# Patient Record
Sex: FEMALE | Race: OTHER | HISPANIC OR LATINO | URBAN - METROPOLITAN AREA
[De-identification: names, ages, dates, MRNs, and addresses within clinical notes are randomized per-mention and may not be internally consistent; named-entity substitution may affect disease eponyms.]

---

## 2021-07-23 ENCOUNTER — EMERGENCY (EMERGENCY)
Facility: HOSPITAL | Age: 46
LOS: 1 days | Discharge: HOME | End: 2021-07-23
Attending: EMERGENCY MEDICINE
Payer: MEDICAID

## 2021-07-23 VITALS
HEIGHT: 60 IN | SYSTOLIC BLOOD PRESSURE: 150 MMHG | HEART RATE: 83 BPM | TEMPERATURE: 100 F | RESPIRATION RATE: 16 BRPM | WEIGHT: 194.89 LBS | DIASTOLIC BLOOD PRESSURE: 83 MMHG | OXYGEN SATURATION: 97 %

## 2021-07-23 LAB
A1C WITH ESTIMATED AVERAGE GLUCOSE RESULT: 5.5 % — SIGNIFICANT CHANGE UP (ref 4–5.6)
ALBUMIN SERPL ELPH-MCNC: 4.2 G/DL — SIGNIFICANT CHANGE UP (ref 3.3–5.2)
ALP SERPL-CCNC: 92 U/L — SIGNIFICANT CHANGE UP (ref 40–120)
ALT FLD-CCNC: 20 U/L — SIGNIFICANT CHANGE UP
ANION GAP SERPL CALC-SCNC: 11 MMOL/L — SIGNIFICANT CHANGE UP (ref 5–17)
APPEARANCE UR: CLEAR — SIGNIFICANT CHANGE UP
AST SERPL-CCNC: 20 U/L — SIGNIFICANT CHANGE UP
BACTERIA # UR AUTO: NEGATIVE — SIGNIFICANT CHANGE UP
BASOPHILS # BLD AUTO: 0.04 K/UL — SIGNIFICANT CHANGE UP (ref 0–0.2)
BASOPHILS NFR BLD AUTO: 0.5 % — SIGNIFICANT CHANGE UP (ref 0–2)
BILIRUB SERPL-MCNC: 0.2 MG/DL — LOW (ref 0.4–2)
BILIRUB UR-MCNC: NEGATIVE — SIGNIFICANT CHANGE UP
BUN SERPL-MCNC: 9.1 MG/DL — SIGNIFICANT CHANGE UP (ref 8–20)
CALCIUM SERPL-MCNC: 9.6 MG/DL — SIGNIFICANT CHANGE UP (ref 8.6–10.2)
CHLORIDE SERPL-SCNC: 104 MMOL/L — SIGNIFICANT CHANGE UP (ref 98–107)
CO2 SERPL-SCNC: 25 MMOL/L — SIGNIFICANT CHANGE UP (ref 22–29)
COLOR SPEC: YELLOW — SIGNIFICANT CHANGE UP
CREAT SERPL-MCNC: 0.64 MG/DL — SIGNIFICANT CHANGE UP (ref 0.5–1.3)
DIFF PNL FLD: ABNORMAL
EOSINOPHIL # BLD AUTO: 0.1 K/UL — SIGNIFICANT CHANGE UP (ref 0–0.5)
EOSINOPHIL NFR BLD AUTO: 1.2 % — SIGNIFICANT CHANGE UP (ref 0–6)
EPI CELLS # UR: SIGNIFICANT CHANGE UP
ESTIMATED AVERAGE GLUCOSE: 111 MG/DL — SIGNIFICANT CHANGE UP (ref 68–114)
GLUCOSE SERPL-MCNC: 100 MG/DL — HIGH (ref 70–99)
GLUCOSE UR QL: NEGATIVE MG/DL — SIGNIFICANT CHANGE UP
HCT VFR BLD CALC: 41.1 % — SIGNIFICANT CHANGE UP (ref 34.5–45)
HGB BLD-MCNC: 13 G/DL — SIGNIFICANT CHANGE UP (ref 11.5–15.5)
IMM GRANULOCYTES NFR BLD AUTO: 0.4 % — SIGNIFICANT CHANGE UP (ref 0–1.5)
KETONES UR-MCNC: NEGATIVE — SIGNIFICANT CHANGE UP
LEUKOCYTE ESTERASE UR-ACNC: ABNORMAL
LYMPHOCYTES # BLD AUTO: 2.6 K/UL — SIGNIFICANT CHANGE UP (ref 1–3.3)
LYMPHOCYTES # BLD AUTO: 31.7 % — SIGNIFICANT CHANGE UP (ref 13–44)
MCHC RBC-ENTMCNC: 27.7 PG — SIGNIFICANT CHANGE UP (ref 27–34)
MCHC RBC-ENTMCNC: 31.6 GM/DL — LOW (ref 32–36)
MCV RBC AUTO: 87.6 FL — SIGNIFICANT CHANGE UP (ref 80–100)
MONOCYTES # BLD AUTO: 0.59 K/UL — SIGNIFICANT CHANGE UP (ref 0–0.9)
MONOCYTES NFR BLD AUTO: 7.2 % — SIGNIFICANT CHANGE UP (ref 2–14)
NEUTROPHILS # BLD AUTO: 4.85 K/UL — SIGNIFICANT CHANGE UP (ref 1.8–7.4)
NEUTROPHILS NFR BLD AUTO: 59 % — SIGNIFICANT CHANGE UP (ref 43–77)
NITRITE UR-MCNC: NEGATIVE — SIGNIFICANT CHANGE UP
PH UR: 7 — SIGNIFICANT CHANGE UP (ref 5–8)
PLATELET # BLD AUTO: 343 K/UL — SIGNIFICANT CHANGE UP (ref 150–400)
POTASSIUM SERPL-MCNC: 4.2 MMOL/L — SIGNIFICANT CHANGE UP (ref 3.5–5.3)
POTASSIUM SERPL-SCNC: 4.2 MMOL/L — SIGNIFICANT CHANGE UP (ref 3.5–5.3)
PROT SERPL-MCNC: 7.6 G/DL — SIGNIFICANT CHANGE UP (ref 6.6–8.7)
PROT UR-MCNC: NEGATIVE MG/DL — SIGNIFICANT CHANGE UP
RBC # BLD: 4.69 M/UL — SIGNIFICANT CHANGE UP (ref 3.8–5.2)
RBC # FLD: 14.1 % — SIGNIFICANT CHANGE UP (ref 10.3–14.5)
RBC CASTS # UR COMP ASSIST: SIGNIFICANT CHANGE UP /HPF (ref 0–4)
SODIUM SERPL-SCNC: 140 MMOL/L — SIGNIFICANT CHANGE UP (ref 135–145)
SP GR SPEC: 1.01 — SIGNIFICANT CHANGE UP (ref 1.01–1.02)
UROBILINOGEN FLD QL: NEGATIVE MG/DL — SIGNIFICANT CHANGE UP
WBC # BLD: 8.21 K/UL — SIGNIFICANT CHANGE UP (ref 3.8–10.5)
WBC # FLD AUTO: 8.21 K/UL — SIGNIFICANT CHANGE UP (ref 3.8–10.5)
WBC UR QL: SIGNIFICANT CHANGE UP

## 2021-07-23 NOTE — ED STATDOCS - OBJECTIVE STATEMENT
46 y/o female with a PMHx of HTN, presents to the ED c/o burning with urination, urinary frequency, associated with pelvis and back pain. Repots nausea and chills. Pt reports hx of symptoms that lasted for an hour, but denies hx of UTI. Denies pregnancy. Pt states she was seen by her PCP 4 days ago went to PCP and the urine test was negative. Reports more vaginal discharge [id number]. Denies vaginal bleeding, diarrhea, constipation, fever, vomiting. NKDA. 46 y/o female with a PMHx of HTN, presents to the ED c/o burning with urination, urinary frequency, associated with pelvis and back pain. Reports nausea and chills. Pt reports hx of symptoms that lasted for an hour, but denies hx of UTI. Denies pregnancy. Pt states she was seen by her PCP 4 days ago and the urine test was negative. Reports more vaginal discharge [id number]. Denies vaginal bleeding, diarrhea, constipation, fever, vomiting. NKDA.

## 2021-07-23 NOTE — ED STATDOCS - NSFOLLOWUPINSTRUCTIONS_ED_ALL_ED_FT
- your work up here is normal  - see your GYN next week for follow up, bring all results with you to that appointment

## 2021-07-23 NOTE — ED STATDOCS - PROGRESS NOTE DETAILS
NP NOTE:  Charting and results reviewed.  Labs unremarkable, pelvic exam unremarkable, abdomin soft NTTP on repeat exam, will need to f/u GYN, vaginal cx pending

## 2021-07-23 NOTE — ED STATDOCS - CLINICAL SUMMARY MEDICAL DECISION MAKING FREE TEXT BOX
Pt presenting with urinary complaints, with recent negative UA. Will repeat UA, send vaginal cultures, obtain labs, reassess. Benign abdominal exam.

## 2021-07-23 NOTE — ED STATDOCS - PATIENT PORTAL LINK FT
You can access the FollowMyHealth Patient Portal offered by [facility] by registering at the following website: [email or web address]. By joining [employer]’s FollowMyHealth portal, you will also be able to view your health information using other applications (apps) compatible with our system.

## 2021-07-24 LAB
C TRACH RRNA SPEC QL NAA+PROBE: SIGNIFICANT CHANGE UP
CULTURE RESULTS: SIGNIFICANT CHANGE UP
N GONORRHOEA RRNA SPEC QL NAA+PROBE: SIGNIFICANT CHANGE UP
SPECIMEN SOURCE: SIGNIFICANT CHANGE UP
SPECIMEN SOURCE: SIGNIFICANT CHANGE UP

## 2021-07-25 LAB
CULTURE RESULTS: SIGNIFICANT CHANGE UP
SPECIMEN SOURCE: SIGNIFICANT CHANGE UP

## 2021-09-24 NOTE — ED ADULT TRIAGE NOTE - AS HEIGHT TYPE
DM c min   OD -Stressed the importance of keeping blood sugars under control blood pressure under control and weight normalization and regular visits with PCP. -Explained the possible effects of poorly controlled diabetes and the damage that diabetes can cause to ocular health. -Patient to check HgbA1C.-Pt instructed to contact our office with any vision changes. PCIOL OUMonitor for PCO Dermatochalasis UL OUBleph sx eval PRNBleph VF PRN; Dr's Notes: DFE 9/24/2021 stated

## 2021-11-04 ENCOUNTER — EMERGENCY (EMERGENCY)
Facility: HOSPITAL | Age: 46
LOS: 1 days | Discharge: HOME | End: 2021-11-04
Attending: EMERGENCY MEDICINE
Payer: MEDICAID

## 2021-11-04 VITALS
WEIGHT: 179.9 LBS | HEART RATE: 92 BPM | OXYGEN SATURATION: 98 % | HEIGHT: 60 IN | SYSTOLIC BLOOD PRESSURE: 153 MMHG | TEMPERATURE: 99 F | DIASTOLIC BLOOD PRESSURE: 98 MMHG | RESPIRATION RATE: 20 BRPM

## 2021-11-04 PROBLEM — Z78.9 OTHER SPECIFIED HEALTH STATUS: Chronic | Status: ACTIVE | Noted: 2021-07-28

## 2021-11-04 LAB
ALBUMIN SERPL ELPH-MCNC: 4.3 G/DL — SIGNIFICANT CHANGE UP (ref 3.3–5.2)
ALP SERPL-CCNC: 90 U/L — SIGNIFICANT CHANGE UP (ref 40–120)
ALT FLD-CCNC: 20 U/L — SIGNIFICANT CHANGE UP
ANION GAP SERPL CALC-SCNC: 13 MMOL/L — SIGNIFICANT CHANGE UP (ref 5–17)
APPEARANCE UR: ABNORMAL
AST SERPL-CCNC: 21 U/L — SIGNIFICANT CHANGE UP
BACTERIA # UR AUTO: ABNORMAL
BASOPHILS # BLD AUTO: 0.05 K/UL — SIGNIFICANT CHANGE UP (ref 0–0.2)
BASOPHILS NFR BLD AUTO: 0.5 % — SIGNIFICANT CHANGE UP (ref 0–2)
BILIRUB SERPL-MCNC: 0.4 MG/DL — SIGNIFICANT CHANGE UP (ref 0.4–2)
BILIRUB UR-MCNC: NEGATIVE — SIGNIFICANT CHANGE UP
BUN SERPL-MCNC: 10.1 MG/DL — SIGNIFICANT CHANGE UP (ref 8–20)
CALCIUM SERPL-MCNC: 9.5 MG/DL — SIGNIFICANT CHANGE UP (ref 8.6–10.2)
CHLORIDE SERPL-SCNC: 103 MMOL/L — SIGNIFICANT CHANGE UP (ref 98–107)
CO2 SERPL-SCNC: 28 MMOL/L — SIGNIFICANT CHANGE UP (ref 22–29)
COLOR SPEC: YELLOW — SIGNIFICANT CHANGE UP
CREAT SERPL-MCNC: 0.68 MG/DL — SIGNIFICANT CHANGE UP (ref 0.5–1.3)
DIFF PNL FLD: ABNORMAL
EOSINOPHIL # BLD AUTO: 0.34 K/UL — SIGNIFICANT CHANGE UP (ref 0–0.5)
EOSINOPHIL NFR BLD AUTO: 3.7 % — SIGNIFICANT CHANGE UP (ref 0–6)
EPI CELLS # UR: SIGNIFICANT CHANGE UP
GLUCOSE SERPL-MCNC: 95 MG/DL — SIGNIFICANT CHANGE UP (ref 70–99)
GLUCOSE UR QL: NEGATIVE MG/DL — SIGNIFICANT CHANGE UP
HCG SERPL-ACNC: <4 MIU/ML — SIGNIFICANT CHANGE UP
HCT VFR BLD CALC: 43.6 % — SIGNIFICANT CHANGE UP (ref 34.5–45)
HGB BLD-MCNC: 14.3 G/DL — SIGNIFICANT CHANGE UP (ref 11.5–15.5)
HIV 1 & 2 AB SERPL IA.RAPID: SIGNIFICANT CHANGE UP
IMM GRANULOCYTES NFR BLD AUTO: 0.4 % — SIGNIFICANT CHANGE UP (ref 0–1.5)
KETONES UR-MCNC: ABNORMAL
LEUKOCYTE ESTERASE UR-ACNC: NEGATIVE — SIGNIFICANT CHANGE UP
LYMPHOCYTES # BLD AUTO: 2.91 K/UL — SIGNIFICANT CHANGE UP (ref 1–3.3)
LYMPHOCYTES # BLD AUTO: 31.5 % — SIGNIFICANT CHANGE UP (ref 13–44)
MCHC RBC-ENTMCNC: 28.1 PG — SIGNIFICANT CHANGE UP (ref 27–34)
MCHC RBC-ENTMCNC: 32.8 GM/DL — SIGNIFICANT CHANGE UP (ref 32–36)
MCV RBC AUTO: 85.7 FL — SIGNIFICANT CHANGE UP (ref 80–100)
MONOCYTES # BLD AUTO: 0.69 K/UL — SIGNIFICANT CHANGE UP (ref 0–0.9)
MONOCYTES NFR BLD AUTO: 7.5 % — SIGNIFICANT CHANGE UP (ref 2–14)
NEUTROPHILS # BLD AUTO: 5.2 K/UL — SIGNIFICANT CHANGE UP (ref 1.8–7.4)
NEUTROPHILS NFR BLD AUTO: 56.4 % — SIGNIFICANT CHANGE UP (ref 43–77)
NITRITE UR-MCNC: NEGATIVE — SIGNIFICANT CHANGE UP
PH UR: 5 — SIGNIFICANT CHANGE UP (ref 5–8)
PLATELET # BLD AUTO: 325 K/UL — SIGNIFICANT CHANGE UP (ref 150–400)
POTASSIUM SERPL-MCNC: 4.2 MMOL/L — SIGNIFICANT CHANGE UP (ref 3.5–5.3)
POTASSIUM SERPL-SCNC: 4.2 MMOL/L — SIGNIFICANT CHANGE UP (ref 3.5–5.3)
PROT SERPL-MCNC: 7.7 G/DL — SIGNIFICANT CHANGE UP (ref 6.6–8.7)
PROT UR-MCNC: 15 MG/DL
RBC # BLD: 5.09 M/UL — SIGNIFICANT CHANGE UP (ref 3.8–5.2)
RBC # FLD: 14.1 % — SIGNIFICANT CHANGE UP (ref 10.3–14.5)
RBC CASTS # UR COMP ASSIST: SIGNIFICANT CHANGE UP /HPF (ref 0–4)
SODIUM SERPL-SCNC: 144 MMOL/L — SIGNIFICANT CHANGE UP (ref 135–145)
SP GR SPEC: 1.01 — SIGNIFICANT CHANGE UP (ref 1.01–1.02)
UROBILINOGEN FLD QL: NEGATIVE MG/DL — SIGNIFICANT CHANGE UP
WBC # BLD: 9.23 K/UL — SIGNIFICANT CHANGE UP (ref 3.8–10.5)
WBC # FLD AUTO: 9.23 K/UL — SIGNIFICANT CHANGE UP (ref 3.8–10.5)
WBC UR QL: NEGATIVE — SIGNIFICANT CHANGE UP

## 2021-11-04 RX ORDER — CEFTRIAXONE 500 MG/1
500 INJECTION, POWDER, FOR SOLUTION INTRAMUSCULAR; INTRAVENOUS ONCE
Refills: 0 | Status: COMPLETED | OUTPATIENT
Start: 2021-11-04 | End: 2021-11-04

## 2021-11-04 RX ADMIN — Medication 100 MILLIGRAM(S): at 12:55

## 2021-11-04 RX ADMIN — CEFTRIAXONE 500 MILLIGRAM(S): 500 INJECTION, POWDER, FOR SOLUTION INTRAMUSCULAR; INTRAVENOUS at 12:55

## 2021-11-04 NOTE — ED PROVIDER NOTE - PROGRESS NOTE DETAILS
Pelvic exam performed by RENÉE Weir with chaperone PA student Lisa Rodriges. No rashes or masses on vulva. internal exam- scant amount of creamy white discharge. no masses or rashes on cervix. Cervical os closed. no blood present. on bimanual exam CMT elicited. spoke with pt regarding transvaginal US and pt now agreeable. Will order and await results. Transvaginal US negative. Will dc with doxy and return precautions.

## 2021-11-04 NOTE — ED PROVIDER NOTE - ATTENDING CONTRIBUTION TO CARE
I, Isamar Ayala, performed the initial face to face bedside interview with this patient regarding history of present illness, review of symptoms and relevant past medical, social and family history.  I completed an independent physical examination.  I was the initial provider who evaluated this patient. I have signed out the follow up of any pending tests (i.e. labs, radiological studies) to the ACP.  I have communicated the patient’s plan of care and disposition with the ACP.

## 2021-11-04 NOTE — ED PROVIDER NOTE - NSFOLLOWUPINSTRUCTIONS_ED_ALL_ED_FT
Follow up with GYN.  Take medication as prescribed.  Avoid sexual contact until results come back.  Come back with new or worsening symptoms.

## 2021-11-04 NOTE — ED PROVIDER NOTE - PATIENT PORTAL LINK FT
You can access the FollowMyHealth Patient Portal offered by [facility] by registering at the following website: [email or web address]. By joining [facility]’s FollowMyHealth portal, you will also be able to view your health information using other applications (apps) compatible with our system.

## 2021-11-04 NOTE — ED PROVIDER NOTE - PHYSICAL EXAMINATION
Head: atraumatic, normacephalic  Face: atraumatic, no crepitus no orbiral/maxillary/mandibular ttp  throat: uvula midline no exudates  eyes: perrla eomi  heart: rrr s1s2  lungs: ctab  abd: soft, nt nd +bs no rebound/guarding no cva ttp; MILD SUPRAPUBIC ttp left >right otherwise abd bening  skin: warm  LE: no swelling, no calf ttp  back: no midline cervical/thoracic/lumbar ttp

## 2021-11-04 NOTE — ED PROVIDER NOTE - OBJECTIVE STATEMENT
45yo F hx of htn, pre-diabetes pw 3 weeks of vaginal discharge and lower abdominal discomfort. + nausea sometimes. +dysuria for the past week. Denies f/c//v/cp/sob/palpitations/ cough/rash/headache/dizziness//d/c/hematuria. no sick contacts no recent travel. notes that saw her gyn 3 months ago for a pap smear. denies gc/chlamydia. notes discharge is yellow in color.

## 2021-11-04 NOTE — ED PROVIDER NOTE - NS ED ROS FT
Denies f/c/v/cp/sob/palpitations/ cough/rash/headache/dizziness//d/c/hematuria  +VAGINAL DISCHARGE DYSURIA LOWER ABDOMINAL DISCOMFORT

## 2021-11-06 LAB
CULTURE RESULTS: SIGNIFICANT CHANGE UP
SPECIMEN SOURCE: SIGNIFICANT CHANGE UP

## 2022-02-24 ENCOUNTER — EMERGENCY (EMERGENCY)
Facility: HOSPITAL | Age: 47
LOS: 1 days | Discharge: HOME | End: 2022-02-24
Attending: EMERGENCY MEDICINE
Payer: MEDICAID

## 2022-02-24 VITALS
DIASTOLIC BLOOD PRESSURE: 90 MMHG | OXYGEN SATURATION: 99 % | SYSTOLIC BLOOD PRESSURE: 150 MMHG | HEART RATE: 77 BPM | RESPIRATION RATE: 18 BRPM | TEMPERATURE: 98 F | HEIGHT: 60 IN

## 2022-02-24 LAB
ALBUMIN SERPL ELPH-MCNC: 4.2 G/DL — SIGNIFICANT CHANGE UP (ref 3.3–5.2)
ALP SERPL-CCNC: 102 U/L — SIGNIFICANT CHANGE UP (ref 40–120)
ALT FLD-CCNC: 33 U/L — HIGH
ANION GAP SERPL CALC-SCNC: 13 MMOL/L — SIGNIFICANT CHANGE UP (ref 5–17)
APPEARANCE UR: CLEAR — SIGNIFICANT CHANGE UP
AST SERPL-CCNC: 26 U/L — SIGNIFICANT CHANGE UP
BACTERIA # UR AUTO: NEGATIVE — SIGNIFICANT CHANGE UP
BASOPHILS # BLD AUTO: 0.07 K/UL — SIGNIFICANT CHANGE UP (ref 0–0.2)
BASOPHILS NFR BLD AUTO: 0.5 % — SIGNIFICANT CHANGE UP (ref 0–2)
BILIRUB SERPL-MCNC: 0.2 MG/DL — LOW (ref 0.4–2)
BILIRUB UR-MCNC: NEGATIVE — SIGNIFICANT CHANGE UP
BUN SERPL-MCNC: 12.7 MG/DL — SIGNIFICANT CHANGE UP (ref 8–20)
CALCIUM SERPL-MCNC: 9.8 MG/DL — SIGNIFICANT CHANGE UP (ref 8.6–10.2)
CHLORIDE SERPL-SCNC: 102 MMOL/L — SIGNIFICANT CHANGE UP (ref 98–107)
CO2 SERPL-SCNC: 23 MMOL/L — SIGNIFICANT CHANGE UP (ref 22–29)
COLOR SPEC: YELLOW — SIGNIFICANT CHANGE UP
CREAT SERPL-MCNC: 0.67 MG/DL — SIGNIFICANT CHANGE UP (ref 0.5–1.3)
DIFF PNL FLD: ABNORMAL
EOSINOPHIL # BLD AUTO: 0.03 K/UL — SIGNIFICANT CHANGE UP (ref 0–0.5)
EOSINOPHIL NFR BLD AUTO: 0.2 % — SIGNIFICANT CHANGE UP (ref 0–6)
EPI CELLS # UR: SIGNIFICANT CHANGE UP
GLUCOSE SERPL-MCNC: 100 MG/DL — HIGH (ref 70–99)
GLUCOSE UR QL: NEGATIVE MG/DL — SIGNIFICANT CHANGE UP
HCG SERPL-ACNC: 4.4 MIU/ML — HIGH
HCT VFR BLD CALC: 43.1 % — SIGNIFICANT CHANGE UP (ref 34.5–45)
HGB BLD-MCNC: 14 G/DL — SIGNIFICANT CHANGE UP (ref 11.5–15.5)
HIV 1 & 2 AB SERPL IA.RAPID: SIGNIFICANT CHANGE UP
IMM GRANULOCYTES NFR BLD AUTO: 0.9 % — SIGNIFICANT CHANGE UP (ref 0–1.5)
KETONES UR-MCNC: NEGATIVE — SIGNIFICANT CHANGE UP
LACTATE BLDV-MCNC: 1.4 MMOL/L — SIGNIFICANT CHANGE UP (ref 0.5–2)
LEUKOCYTE ESTERASE UR-ACNC: NEGATIVE — SIGNIFICANT CHANGE UP
LYMPHOCYTES # BLD AUTO: 27.6 % — SIGNIFICANT CHANGE UP (ref 13–44)
LYMPHOCYTES # BLD AUTO: 3.62 K/UL — HIGH (ref 1–3.3)
MCHC RBC-ENTMCNC: 27.4 PG — SIGNIFICANT CHANGE UP (ref 27–34)
MCHC RBC-ENTMCNC: 32.5 GM/DL — SIGNIFICANT CHANGE UP (ref 32–36)
MCV RBC AUTO: 84.3 FL — SIGNIFICANT CHANGE UP (ref 80–100)
MONOCYTES # BLD AUTO: 0.74 K/UL — SIGNIFICANT CHANGE UP (ref 0–0.9)
MONOCYTES NFR BLD AUTO: 5.6 % — SIGNIFICANT CHANGE UP (ref 2–14)
NEUTROPHILS # BLD AUTO: 8.55 K/UL — HIGH (ref 1.8–7.4)
NEUTROPHILS NFR BLD AUTO: 65.2 % — SIGNIFICANT CHANGE UP (ref 43–77)
NITRITE UR-MCNC: NEGATIVE — SIGNIFICANT CHANGE UP
PH UR: 6 — SIGNIFICANT CHANGE UP (ref 5–8)
PLATELET # BLD AUTO: 405 K/UL — HIGH (ref 150–400)
POTASSIUM SERPL-MCNC: 4 MMOL/L — SIGNIFICANT CHANGE UP (ref 3.5–5.3)
POTASSIUM SERPL-SCNC: 4 MMOL/L — SIGNIFICANT CHANGE UP (ref 3.5–5.3)
PROT SERPL-MCNC: 7.9 G/DL — SIGNIFICANT CHANGE UP (ref 6.6–8.7)
PROT UR-MCNC: NEGATIVE — SIGNIFICANT CHANGE UP
RBC # BLD: 5.11 M/UL — SIGNIFICANT CHANGE UP (ref 3.8–5.2)
RBC # FLD: 14.4 % — SIGNIFICANT CHANGE UP (ref 10.3–14.5)
RBC CASTS # UR COMP ASSIST: SIGNIFICANT CHANGE UP /HPF (ref 0–4)
SODIUM SERPL-SCNC: 138 MMOL/L — SIGNIFICANT CHANGE UP (ref 135–145)
SP GR SPEC: 1.02 — SIGNIFICANT CHANGE UP (ref 1.01–1.02)
UROBILINOGEN FLD QL: NEGATIVE MG/DL — SIGNIFICANT CHANGE UP
WBC # BLD: 13.13 K/UL — HIGH (ref 3.8–10.5)
WBC # FLD AUTO: 13.13 K/UL — HIGH (ref 3.8–10.5)
WBC UR QL: NEGATIVE /HPF — SIGNIFICANT CHANGE UP (ref 0–5)

## 2022-02-24 RX ORDER — KETOROLAC TROMETHAMINE 30 MG/ML
15 SYRINGE (ML) INJECTION ONCE
Refills: 0 | Status: DISCONTINUED | OUTPATIENT
Start: 2022-02-24 | End: 2022-02-24

## 2022-02-24 RX ORDER — SODIUM CHLORIDE 9 MG/ML
1000 INJECTION INTRAMUSCULAR; INTRAVENOUS; SUBCUTANEOUS ONCE
Refills: 0 | Status: COMPLETED | OUTPATIENT
Start: 2022-02-24 | End: 2022-02-24

## 2022-02-24 RX ADMIN — Medication 15 MILLIGRAM(S): at 10:00

## 2022-02-24 RX ADMIN — SODIUM CHLORIDE 1000 MILLILITER(S): 9 INJECTION INTRAMUSCULAR; INTRAVENOUS; SUBCUTANEOUS at 10:05

## 2022-02-24 NOTE — ED STATDOCS - CLINICAL SUMMARY MEDICAL DECISION MAKING FREE TEXT BOX
PT w/ dysuria and lower abdominal pain. Will get labs, pelvic ultrasound, meds, reassess. PT w/ dysuria and lower abdominal pain. Will get labs, pelvic ultrasound, meds, reassess. also do STD testing including HIV, pt agrees to it

## 2022-02-24 NOTE — ED ADULT TRIAGE NOTE - CHIEF COMPLAINT QUOTE
used
pt c/o lower abdominal pain, on urination has burning & pain, had chills & nausea, onset  x 9 days, seen at clinic urine test was ok, c/o abd pain  A&Ox3, resp wnl

## 2022-02-24 NOTE — ED STATDOCS - GENITOURINARY, MLM
normal... Chaperone: Katherine Bo - normal vaginal exam. No rash. No exudates. (+) swelling Chaperone: Katherine Bo - normal external vaginal exam. No rash. No exudates, no swelling, gross discharge., small clitoris but pt denies any surgeries

## 2022-02-24 NOTE — ED STATDOCS - PROGRESS NOTE DETAILS
Dontrell Brown: Pt seen by intake physician and HPI/ROS/PE/plan reviewed Confirmed and adjusted were appropriate.    PE: GEN: Awake, alert,  NAD,  EYES: PERRL CARDIAC: Reg rate and rhythm, S1,S2, RRR  RESP: No distress noted. Lungs CTA bilaterally no wheeze, ronchi, rales. ABD: soft,  non-tender, no guarding. . NEURO: AOx3, no focal deficits   PLAN: PT with stable VS, no acute distress, non toxic appearing, tolerating PO in the ED, Pt with mild elevated HCG pt is post partum last mensis over 3 yr ago likely not preg pt educated and informed of finding and need to follow up to private GYN. Pt with no acute findings on urine or US, Pt states that pain started after intercourse, pt admits that she has had internment months of symptoms after intercourse, pt admits to feeling that her vagina is dryer then it used to be. Pt symptoms likely related vaginal irritation, pt states that she has no vaginal bleeding, no vaginal discharge, pt educated to dc home with supportive care, use of water base lubricant during sex, follow up to GYN, educated about when to return to the ED if needed. PT verbalizes that he understands all instructions and results. Pt informed that ED is open and available 24/7 365 days a yr, encouraged to return to the ED if they have any change in condition, or feel the need for revaluation.   Pt informed that they will be contacted if after culture is resulted there is a need for change in ABx or return to the ED.    utilized to obtain History, ROS, Physical Exam, explanations of results and plan of care, as well as follow up instructions.

## 2022-02-24 NOTE — ED STATDOCS - PATIENT PORTAL LINK FT
You can access the FollowMyHealth Patient Portal offered by [facility] by registering at the following website: [email or web address]. By joining [employer]’s FollowMyHealth portal, you will also be able to view your health information using other applications (apps) compatible with our system.

## 2022-02-24 NOTE — ED ADULT NURSE NOTE - CHIEF COMPLAINT QUOTE
pt c/o lower abdominal pain, on urination has burning & pain, had chills & nausea, onset  x 9 days, seen at clinic urine test was ok, c/o abd pain  A&Ox3, resp wnl

## 2022-02-24 NOTE — ED STATDOCS - ADDITIONAL NOTES AND INSTRUCTIONS:
PT was evaluated At [facility] ED and was found to have a condition that warranted time of to rest and heal from WORK/SCHOOL.   Dontrell Gonzalez PA-C

## 2022-02-24 NOTE — ED STATDOCS - NSFOLLOWUPINSTRUCTIONS_ED_ALL_ED_FT
Mantenimiento de la mary de las mujeres posmenopáusicas    Health Maintenance for Postmenopausal Women      La menopausia es un proceso normal en el cual la capacidad de quedar embarazada llega a jauregui fin. Iva proceso ocurre lentamente a lo lemuel de un período de muchos meses o años; por lo general, entre los 48 y los 55 años. La menopausia es completa cuando no se ha tenido el período menstrual por 12 meses.    Es importante hablar con el médico sobre algunas de las enfermedades más comunes que afectan a las mujeres después de la menopausia (mujeres posmenopáusicas). Estas incluyen la enfermedad cardíaca, el cáncer y la pérdida ósea (osteoporosis). Adoptar un estilo de jadyn saludable y recibir atención preventiva pueden ayudar a promover la mary y el bienestar. Las medidas que tome también pueden reducir las probabilidades de desarrollar algunas de estas enfermedades frecuentes.      ¿Qué dl saber acerca de la menopausia?    Mary la menopausia, puede tener onofre serie de síntomas, por ejemplo:  •Acaloramiento. Estos pueden ser moderados o intensos.      •Sudoración nocturna.      •Disminución del deseo sexual.      •Cambios en el estado de ánimo.      •Chris de angela.      •Cansancio.      •Irritabilidad.      •Problemas de memoria.      •Insomnio.      Tratar o no estos síntomas es onofre decisión que se sharmin con el médico.      ¿Necesito terapia de reemplazo hormonal?    •La terapia de reemplazo hormonal es eficaz para tratar los síntomas causados por la menopausia, kathy los acaloramientos y las sudoraciones nocturnas.      •La reposición hormonal conlleva ciertos riesgos, especialmente a medida que onofre landon envejece. Si está pensando en usar estrógeno o estrógeno con progestina, analice los beneficios y los riesgos con el médico.        ¿Cuál es mi riesgo de sufrir enfermedad cardíaca y accidente cerebrovascular?    A medida que se envejece, aumenta el riesgo de enfermedad cardíaca, infarto de miocardio y accidente cerebrovascular. Onofre de las causas puede ser un cambio en las hormonas del cuerpo mary la menopausia. [place] puede afectar la forma en que el organismo procesa las grasas, los triglicéridos y el colesterol de jauregui dieta. El infarto de miocardio y el accidente cerebrovascular son emergencias médicas. Hay muchas cosas que se pueden hacer para ayudar a prevenir la enfermedad cardíaca y el accidente cerebrovascular.    Contrólese la presión arterial     •La hipertensión arterial causa enfermedades cardíacas y aumenta el riesgo de accidente cerebrovascular. Es más probable que esto se manifieste en las personas que tienen lecturas de presión arterial shyann, tienen ascendencia africana o tienen sobrepeso.    •Hágase controlar la presión arterial:  •Cada 3 a 5 años si tiene entre 18 y 39 años.      •Todos los años si es mayor de 40 años.          Consuma onofre dieta saludable      •Consuma onofre dieta que incluya muchas verduras, frutas, productos lácteos con bajo contenido de grasa y proteínas magras.      • No consuma muchos alimentos ricos en grasas sólidas, azúcares agregados o sodio.      Addie ejercicio con regularidad     Addie ejercicio con regularidad. Esta es onofre de las prácticas más importantes que puede hacer por jauregui mary. La mayoría de los adultos deben seguir estas pautas:  •Intente realizar al menos 150 minutos de actividad física por semana. El ejercicio debe aumentar la frecuencia cardíaca y hacerlo transpirar (ejercicio de intensidad moderada).      •Intente hacer ejercicios de elongación por lo menos dos veces por semana. Agréguelos al plan de ejercicio de intensidad moderada.      •Pasar menos tiempo sentados. Incluso la actividad física ligera puede ser beneficiosa.      Otros consejos    •Trabaje con jauregui médico para alcanzar o mantener un peso saludable.      •No consuma ningún producto que contenga nicotina o tabaco, kathy cigarrillos, cigarrillos electrónicos y tabaco de mascar. Si necesita ayuda para dejar de fumar, consulte al médico.      •Conozca robert cifras. Pídale al médico que le controle el colesterol y el nivel sanguíneo de azúcar en la vy (glucosa). Siga haciéndose análisis de vy kathy se lo haya indicado el médico.        ¿Necesito realizarme pruebas de detección del cáncer?    Según jauregui historia clínica y robert antecedentes familiares, es posible que deba realizarse pruebas de detección del cáncer en diferentes etapas de la jadyn. [place] puede incluir pruebas de detección de lo siguiente:  •Cáncer de mama.      •Cáncer de celia uterino.      •Cáncer de pulmón.      •Cáncer colorrectal.        ¿Cuál es mi riesgo de tener osteoporosis?    Después de la menopausia, puede correr un riesgo más alto de tener osteoporosis. La osteoporosis es onofre afección en la cual la destrucción de la masa ósea ocurre con mayor rapidez que jauregui formación. Para ayudar a prevenir esta afección o las fracturas óseas que pueden ocurrir a causa de esta, usted puede vero las siguientes medidas:  •Si tiene entre 19 y 50 años, tome kathy mínimo 1000 mg de calcio y 600 mg de vitamina D por día.      •Si es mayor de 50 años millie ally de 70 años, tome kathy mínimo 1200 mg de calcio y 600 mg de vitamina D por día.      •Si es mayor de 70 años, tome kathy mínimo 1200 mg de calcio y 800 mg de vitamina D por día.      Fumar y beber alcohol en exceso aumentan el riesgo de osteoporosis. Consuma alimentos ricos en calcio y vitamina D, y addie ejercicios con soporte de peso varias veces a la semana, kathy se lo haya indicado el médico.      ¿De qué manera la menopausia afecta mi mary mental?    La depresión puede presentarse a cualquier edad, millie es más frecuente a medida que onofre persona envejece. Los síntomas comunes de depresión incluyen lo siguiente:  •Desánimo o tristeza.      •Cambios en los patrones de sueño.      •Cambios en el apetito o en los hábitos de alimentación.      •Sensación de falta general de motivación o placer al realizar las actividades que solía disfrutar.      •Crisis frecuentes de llanto.      Hable con el médico si gordy que tiene depresión.      Instrucciones generales    Visite a jauregui médico para hacerse exámenes de bienestar periódicos y aplicarse vacunas. Puede incluir:  •Programar exámenes periódicos dentales, de la mary y de la vista.    •Recibir y mantener las vacunas. Estos incluyen los siguientes:  •Vacuna contra la gripe. Aplíquese esta vacuna todos los años antes de que comience la temporada de gripe.      •Vacuna contra la neumonía.      •Vacuna contra el herpes.      •Vacuna contra el tétanos, la difteria y la tos ferina (Tdap).        El médico también puede recomendarle que se aplique otras vacunas.    Notifique a jauregui médico si alguna vez ha sido víctima de abuso o si no se siente seguro en jauregui hogar.      Resumen    •La menopausia es un proceso normal en el cual la capacidad de quedar embarazada llega a jauregui fin.      •Esta condición causa acaloramientos, sudoraciones nocturnas, disminución del interés en el sexo, cambios en el estado de ánimo, chris de angela o falta de sueño.      •El tratamiento de esta afección puede incluir onofre terapia de reemplazo hormonal.      •[place] medidas para mantenerse silverio, entre ellas, hacer ejercicio con regularidad, seguir onofre dieta saludable, controlar jauregui peso y medirse la presión arterial y los niveles de azúcar en la vy.      •Hágase pruebas para detectar cáncer y depresión. Asegúrese de estar al día con todas las vacunas.      Esta información no tiene kathy fin reemplazar el consejo del médico. Asegúrese de hacerle al médico cualquier pregunta que tenga.

## 2022-02-24 NOTE — ED STATDOCS - OBJECTIVE STATEMENT
45 Y/O female w/ PMHx. of HTN and DM w/ PSHx. of appendectomy and x2 c-sections presents to ED c/o vaginal burning w/ back pain. PT endorses vaginal discharge. PT endorses increased pain s/p sexual intercourse. PT went to GYN x6 months ago w/ normal pap smear and negative STI results. PT LMP x3 years ago and not currently menstruating. 47 Y/O female w/ PMHx. of HTN and DM w/ PSHx. of appendectomy and x2 c-sections presents to ED c/o vaginal burning w/ back pain. PT endorses vaginal discharge. PT endorses increased pain s/p sexual intercourse. PT went to GYN x6 months ago w/ normal pap smear and negative STI results. PT LMP x3 years ago and not currently menstruating.    ED  Kath

## 2022-02-24 NOTE — ED ADULT NURSE NOTE - OBJECTIVE STATEMENT
Pt walked in c/o abd pain, urinary frequency, burning and pain on urination for 9x days as per pt seen and treated at the clinic but symptoms have not resolved, in no acute distress cristi juarez

## 2022-02-25 LAB
CULTURE RESULTS: SIGNIFICANT CHANGE UP
SPECIMEN SOURCE: SIGNIFICANT CHANGE UP
T PALLIDUM AB TITR SER: NEGATIVE — SIGNIFICANT CHANGE UP

## 2022-09-24 ENCOUNTER — EMERGENCY (EMERGENCY)
Facility: HOSPITAL | Age: 47
LOS: 1 days | Discharge: HOME | End: 2022-09-24
Attending: STUDENT IN AN ORGANIZED HEALTH CARE EDUCATION/TRAINING PROGRAM
Payer: MEDICAID

## 2022-09-24 VITALS
WEIGHT: 195.11 LBS | DIASTOLIC BLOOD PRESSURE: 93 MMHG | HEIGHT: 60 IN | RESPIRATION RATE: 20 BRPM | TEMPERATURE: 98 F | HEART RATE: 82 BPM | SYSTOLIC BLOOD PRESSURE: 159 MMHG

## 2022-09-24 VITALS — OXYGEN SATURATION: 97 % | RESPIRATION RATE: 19 BRPM

## 2022-09-24 PROBLEM — I10 ESSENTIAL (PRIMARY) HYPERTENSION: Chronic | Status: ACTIVE | Noted: 2022-02-24

## 2022-09-24 RX ORDER — CEPHALEXIN 500 MG
500 CAPSULE ORAL ONCE
Refills: 0 | Status: COMPLETED | OUTPATIENT
Start: 2022-09-24 | End: 2022-09-24

## 2022-09-24 RX ORDER — CEPHALEXIN 500 MG
1 CAPSULE ORAL
Qty: 14 | Refills: 0
Start: 2022-09-24 | End: 2022-09-30

## 2022-09-24 RX ADMIN — Medication 500 MILLIGRAM(S): at 10:35
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
## 2022-09-24 NOTE — ED ADULT NURSE NOTE - OBJECTIVE STATEMENT
Patient AAOx3 c/o abcess to right thigh/hip. Denies fever, chills, NVD. Well appearing. Given atx as per provider order.

## 2022-09-24 NOTE — ED PROVIDER NOTE - PATIENT PORTAL LINK FT
You can access the FollowMyHealth Patient Portal offered by [facility] by registering at the following website: [email or web address]. By joining [employer]’s FollowMyHealth portal, you will also be able to view your health information using other applications (apps) compatible with our system.

## 2022-09-24 NOTE — ED PROVIDER NOTE - CLINICAL SUMMARY MEDICAL DECISION MAKING FREE TEXT BOX
Patient with probable abscess or infected hematoma to the left lateral thigh. Will send culture and treat.

## 2022-09-24 NOTE — ED PROVIDER NOTE - SKIN, MLM
Skin normal color for race, warm, dry and intact. No evidence of rash. 4x2 cm bolus lesion with localized erythema noted to the left lateral thigh region, soft, non indurated, mild tenderness

## 2022-09-24 NOTE — ED PROVIDER NOTE - NSFOLLOWUPINSTRUCTIONS_ED_ALL_ED_FT
1) Follow up with your doctor in one week  2) Return to the ER for worsening or concerning symptoms  3)Take medication as prescribed  4) Take ibuprofen and/or acetaminophen for pain control    1) Seguimiento con jauregui médico en onofre semana  2) Regrese a la duy de emergencias por empeoramiento o síntomas preocupantes  3) Ellen la medicación según lo prescrito.  4) Ellen ibuprofeno y/o paracetamol para controlar el dolor  Absceso    LO QUE NECESITA SABER:    Onofre compresa tibia puede ayudar a que el absceso drene. Jauregui médico hará onofre incisión en el absceso para que pueda drenar. Usted puede necesitar cirugía para extirpar un absceso en las olvin o los glúteos.  Absceso cutáneo         [facility]STRUCCIONES [facility]BRE [facility]:    Regrese a la duy de emergencias si:  •El área alrededor del absceso se pone muy dolorosa, caliente o tiene manchas perez.      •Usted tiene fiebre y escalofríos.      •Jauregui corazón está latiendo más rápido de lo normal.      •Se siente desmayar o confundido.      Llame a jauregui médico si:  •Jauregui absceso se hace más shaun o no mejora.      •El absceso se vuelve a formar.      •Usted tiene preguntas o inquietudes acerca de jauregui condición o cuidado.      Medicamentos:Es posible que usted necesite alguno de los siguientes:  •Los antibióticosayudan a tratar onofre infección bacteriana.      •Acetaminofénalivia el dolor y baja la fiebre. Está disponible sin receta médica. Pregunte la cantidad y la frecuencia con que debe tomarlos. Siga las indicaciones. Audi las etiquetas de todos los demás medicamentos que esté usando para saber si también contienen acetaminofén, o pregunte a jauregui médico o farmacéutico. El acetaminofén puede causar daño en el hígado cuando no se sharmin de forma correcta.      •AINEcomo el ibuprofeno, ayudan a disminuir la inflamación, el dolor y la fiebre. Iva medicamento está disponible con o sin onofre receta médica. Los MORE pueden causar sangrado estomacal o problemas renales en ciertas personas. Si usted sharmin un medicamento anticoagulante, siempre pregúntele a jauregui médico si los MORE son seguros para usted. Siempre audi la etiqueta de iva medicamento y siga las instrucciones.      •[place] robert medicamentos kathy se le haya indicado.Consulte con juaregui médico si usted gordy que jauregui medicamento no le está ayudando o si presenta efectos secundarios. Infórmele al médico si usted es alérgico a algún medicamento. Mantenga onofre lista actualizada de los medicamentos, las vitaminas y los productos herbales que sharmin. Incluya los siguientes datos de los medicamentos: cantidad, frecuencia y motivo de administración. Traiga con usted la lista o los envases de las píldoras a robert citas de seguimiento. Lleve la lista de los medicamentos con usted en fabian de onofre emergencia.      Cuidados personales:  •Aplique onofre compresa tibia en el absceso.[place]ubero ayudará a que el absceso se elina y drene. Moje onofre toallita en agua tibia millie no caliente. Aplicar la compresa chris 10 minutos. Harry esto 4 veces al día.  No presione el absceso ni trate de abrirlo con onofre aguja. Puede empujar las bacterias de manera más profunda hacia la vy.      •No compartir con nadie jauregui ropa, toallas o sábanas.Cubero puede propagar la infección a otros.      •Lávese las olvin frecuentemente.Cubero ayudará a prevenir la propagación de gérmenes. Use jabón y agua o un ungüento con base de alcohol.  Lavado de olvin           Cuidar la herida después del drenaje:  •Siga las instrucciones de jauregui médico sobre el cuidado de robert heridas.Si jauregui médico dice que está orsalinda, retire cuidadosamente el vendaje y la gasa. Puede que necesite empapar la gasa para poder sacarla de la herida. Limpié la herida y el área a jauregui alrededor según indicaciones. Seque el área y póngase onofre venda nueva y limpia. Cambie robert vendajes cuando se mojen o ensucien.      •Pregúntele a jauregui médico cómo cambiarse la gasa en jauregui herida.Cuente el número de apósitos de gasa que se colocan dentro de jauregui herida. No ponga demasiada gasa en la herida. No aprete demasiado la herida con la gasa.      Acuda dentro de 1 o 3 días a la consulta de control con jauregui médico:Es probable que usted necesite que le remuevan robert compresas o le cambien las vendas. Anote robert preguntas para que se acuerde de hacerlas chris robert visitas.

## 2022-09-26 ENCOUNTER — EMERGENCY (EMERGENCY)
Facility: HOSPITAL | Age: 47
LOS: 1 days | Discharge: HOME | End: 2022-09-26
Attending: EMERGENCY MEDICINE
Payer: MEDICAID

## 2022-09-26 VITALS
HEIGHT: 60 IN | DIASTOLIC BLOOD PRESSURE: 84 MMHG | OXYGEN SATURATION: 96 % | TEMPERATURE: 98 F | RESPIRATION RATE: 18 BRPM | WEIGHT: 195.11 LBS | HEART RATE: 76 BPM | SYSTOLIC BLOOD PRESSURE: 134 MMHG

## 2022-09-26 NOTE — ED PROVIDER NOTE - NS ED ROS FT
Gen: denies fever, chills, fatigue, weight loss  Skin: +packing removal. denies rashes, laceration, bruising  HEENT: denies visual changes, ear pain, nasal congestion, throat pain  MSK: denies joint swelling/pain, back pain, neck pain  Neuro: denies headache, dizziness, weakness, numbness

## 2022-09-26 NOTE — ED PROVIDER NOTE - NS_EDPROVIDERDISPOUSERTYPE_ED_A_ED
Reason for Disposition   Ear congestion present > 48 hours    Answer Assessment - Initial Assessment Questions  1. LOCATION: \"Which ear is involved? \"        Right ear    2. SENSATION: \"Describe how the ear feels. \"       Pressure, pain    3. ONSET:  \"When did the ear symptoms start? \"        Couple days ago     4. PAIN: \"Do you also have an earache? \" If so, ask: \"How bad is it? \" (Scale 1-10; or mild, moderate, severe)      Yes moderate    5. CAUSE: \"What do you think is causing the ear congestion? \"      Not sure fluid      6. URI: \"Do you have a runny nose or cough? \"       No    7. NASAL ALLERGIES: \"Are there symptoms of hay fever, such as sneezing or a clear nasal discharge? \"      No    8. PREGNANCY: \"Is there any chance you are pregnant? \" \"When was your last menstrual period? \"      no    Protocols used: EAR - CONGESTION-ADULT-OH    Attention Provider: Thank you for allowing me to participate in the care of your patient. The patient was connected to triage in response to information provided to the [facility]. Please do not respond through this encounter as the response is not directed to a shared pool.      Warm transfer to [facility] Attending Attestation (For Attendings USE Only)...

## 2022-09-26 NOTE — ED PROVIDER NOTE - CLINICAL SUMMARY MEDICAL DECISION MAKING FREE TEXT BOX
47yo F presenting for wound check and packing removal. packing removed, no erythema or purulent drainage. new dressing applied. pt encouraged to continue wound care and finish abx

## 2022-09-26 NOTE — ED PROVIDER NOTE - PATIENT PORTAL LINK FT
You can access the FollowMyHealth Patient Portal offered by [facility] by registering at the following website: [email or web address]. By joining [employer]’s FollowMyHealth portal, you will also be able to view your health information using other applications (apps) compatible with our system.

## 2022-09-26 NOTE — ED PROVIDER NOTE - OBJECTIVE STATEMENT
47yo F presenting for wound check and packing removal. Had abscess I&D to left upper glute area 9/24. states she has been cleaning area as directed, was told to come back to have packing removed. also taking abx as directed. denies fever, chills, worsening redness.   : Abraham Millan

## 2022-09-26 NOTE — ED PROVIDER NOTE - PHYSICAL EXAMINATION
Gen: No acute distress, non toxic  HENT: NCAT, Mucous membranes moist, Oropharynx without exudates, uvula midline  Eyes: pink conjunctivae, EOMI, PERRL  Neuro: A&O x 3, CN II-XII intact, sensorimotor intact without deficits   MSK: No spine or joint tenderness to palpation, Full ROM ext x 4  Skin: +Open incision to left upper buttock with packing in place. no surrounding erythema or induration.     Chaperone: Abraham Millan,

## 2022-09-26 NOTE — ED PROVIDER NOTE - NSFOLLOWUPINSTRUCTIONS_ED_ALL_ED_FT
- Return to the ED for any new or worsening symptoms.   - Keep area clean, wash with soap and water multiple times per day  - Finish antibiotics    - Regrese al servicio de urgencias por cualquier síntoma nuevo o que empeore.  - Mantenga el área limpia, lávela con agua y jabón varias veces al día  - Terminar los antibióticos

## 2022-09-29 LAB
-  AMPICILLIN: SIGNIFICANT CHANGE UP
-  TETRACYCLINE: SIGNIFICANT CHANGE UP
-  VANCOMYCIN: SIGNIFICANT CHANGE UP
CULTURE RESULTS: SIGNIFICANT CHANGE UP
METHOD TYPE: SIGNIFICANT CHANGE UP
ORGANISM # SPEC MICROSCOPIC CNT: SIGNIFICANT CHANGE UP
ORGANISM # SPEC MICROSCOPIC CNT: SIGNIFICANT CHANGE UP
SPECIMEN SOURCE: SIGNIFICANT CHANGE UP

## 2022-12-08 NOTE — ED ADULT TRIAGE NOTE - SOURCE OF INFORMATION
Patient
Sofia Love; MPH)  Orthopaedic Surgery  [address], [address]  [place] [address]  Phone: [phone]  Fax: [phone]  Follow Up Time:     Kaleigh Ocasio)  Plastic Surgery  [address]  [place], [place] [id number]  Phone: [phone]  Fax: [phone]  Follow Up Time:

## 2023-06-23 ENCOUNTER — APPOINTMENT (OUTPATIENT)
Dept: MAMMOGRAPHY | Facility: CLINIC | Age: 48
End: 2023-06-23

## 2023-08-11 NOTE — ED STATDOCS - ATTENDING CONTRIBUTION TO CARE
I performed the initial face to face bedside interview with this patient regarding history of present illness, review of symptoms and past medical, social and family history.  I completed an independent physical examination.  I was the initial provider who evaluated this patient.  The history, review of symptoms and examination was documented by the scribe in my presence and I attest to the accuracy of the documentation.  I have signed out the follow up of any pending tests (i.e. labs, radiological studies) to the ACP.  I have discussed the patient’s plan of care and disposition with the ACP Dapsone Counseling: I discussed with the patient the risks of dapsone including but not limited to hemolytic anemia, agranulocytosis, rashes, methemoglobinemia, kidney failure, peripheral neuropathy, headaches, GI upset, and liver toxicity.  Patients who start dapsone require monitoring including baseline LFTs and weekly CBCs for the first month, then every month thereafter.  The patient verbalized understanding of the proper use and possible adverse effects of dapsone.  All of the patient's questions and concerns were addressed.

## 2025-03-28 NOTE — ED PROVIDER NOTE - CPE EDP CARDIAC NORM
03/28/25 1049   Discharge Assessment   Assessment Type Discharge Planning Assessment   Confirmed/corrected address, phone number and insurance Yes   Confirmed Demographics Correct on Facesheet   Source of Information patient   When was your last doctors appointment?   (July 2024)   Communicated DIA with patient/caregiver Date not available/Unable to determine   Reason For Admission ARF   People in Home spouse   Facility Arrived From: Home   Do you expect to return to your current living situation? Yes   Do you have help at home or someone to help you manage your care at home? Yes   Who are your caregiver(s) and their phone number(s)? SpouseTahira [phone]   Prior to hospitilization cognitive status: Unable to Assess   Current cognitive status: Alert/Oriented   Walking or Climbing Stairs Difficulty no   Dressing/Bathing Difficulty no   Home Accessibility wheelchair accessible   Home Layout Able to live on 1st floor   Equipment Currently Used at Home none   Patient currently being followed by outpatient case management? No   Do you currently have service(s) that help you manage your care at home? No   Do you take prescription medications? Yes  ([place]ri in [place])   Do you have prescription coverage? Yes   Coverage [facility]   Do you have any problems affording any of your prescribed medications? No   Is the patient taking medications as prescribed? yes   Who is going to help you get home at discharge? Spouse, Tahira   How do you get to doctors appointments? car, drives self   Are you on dialysis? No   Do you take coumadin? No   Discharge Plan A Home   Discharge Plan B Home   DME Needed Upon Discharge  none   Discharge Plan discussed with: Patient   Transition of Care Barriers None   Physical Activity   On average, how many days per week do you engage in moderate to strenuous exercise (like a brisk walk)? 0 days   On average, how many minutes do you engage in exercise at this level? 0 min   Social  Isolation   How often do you feel lonely or isolated from those around you?  Never   Alcohol Use   Q1: How often do you have a drink containing alcohol? 4 or more ti   Q2: How many drinks containing alcohol do you have on a typical day when you are drinking? 1 or 2   Q3: How often do you have six or more drinks on one occasion? Never   OTHER   Name(s) of People in Home Spouse, Tahira     Assessment completed at bedside.  Patient lives with spouse.  She works and drives.  Spouse can assist with discharge transportation.   PCP-Kate Almeida  Pharmacy-[facility]    No DME in the home  No HH in the past    No discharge needs identified at this time.     Will continue to follow   normal...